# Patient Record
(demographics unavailable — no encounter records)

---

## 2025-07-25 NOTE — HISTORY OF PRESENT ILLNESS
[Patient reported mammogram was normal] : Patient reported mammogram was normal [Patient reported breast sonogram was normal] : Patient reported breast sonogram was normal [Patient reported PAP Smear was normal] : Patient reported PAP Smear was normal [Patient reported colonoscopy was normal] : Patient reported colonoscopy was normal [HIV Test offered] : HIV Test offered [Syphilis test offered] : Syphilis test offered [Gonorrhea test offered] : Gonorrhea test offered [Chlamydia test offered] : Chlamydia test offered [Trichomonas test offered] : Trichomonas test offered [HPV test offered] : HPV test offered [Hepatitis B test offered] : Hepatitis B test offered [Hepatitis C test offered] : Hepatitis C test offered [Y] : Patient is sexually active [FreeTextEntry1] : 54 year old female here today for well women exam. Familial history of breast cancer in mother age 50, BRCA negative. Colon cancer in father. Notes menses have been irregular. Denies VMS. History of mammoplasty 2006.  [Mammogramdate] : 8/2024 [BreastSonogramDate] : 8/2024 [PapSmeardate] : 7/2024 [ColonoscopyDate] : 11/2022 [LMPDate] : 7/9/25

## 2025-07-25 NOTE — PLAN
[FreeTextEntry1] : Normal CBE however given silicone implants placed more than 10years ago recommend breast MRI for implant integrity. Pap collected, STI testing offered and declined. Reviewed menopause definition, s/s that would warrant evaluation and or symptoms we could treat with HRT. Continue weight bearing exercises as tolerated. Follow up in 1yr or sooner as needed.